# Patient Record
Sex: FEMALE | Race: WHITE | URBAN - METROPOLITAN AREA
[De-identification: names, ages, dates, MRNs, and addresses within clinical notes are randomized per-mention and may not be internally consistent; named-entity substitution may affect disease eponyms.]

---

## 2017-08-30 ENCOUNTER — EMERGENCY (EMERGENCY)
Facility: HOSPITAL | Age: 52
LOS: 1 days | Discharge: HOME | End: 2017-08-30
Attending: EMERGENCY MEDICINE | Admitting: EMERGENCY MEDICINE
Payer: MEDICAID

## 2017-08-30 VITALS
RESPIRATION RATE: 17 BRPM | OXYGEN SATURATION: 100 % | HEART RATE: 82 BPM | TEMPERATURE: 98 F | DIASTOLIC BLOOD PRESSURE: 76 MMHG | SYSTOLIC BLOOD PRESSURE: 107 MMHG

## 2017-08-30 VITALS — SYSTOLIC BLOOD PRESSURE: 115 MMHG | HEART RATE: 75 BPM | DIASTOLIC BLOOD PRESSURE: 70 MMHG

## 2017-08-30 DIAGNOSIS — M25.512 PAIN IN LEFT SHOULDER: ICD-10-CM

## 2017-08-30 PROCEDURE — 99284 EMERGENCY DEPT VISIT MOD MDM: CPT | Mod: 25

## 2017-08-30 PROCEDURE — 73030 X-RAY EXAM OF SHOULDER: CPT | Mod: 26,LT

## 2017-08-30 RX ORDER — KETOROLAC TROMETHAMINE 30 MG/ML
60 SYRINGE (ML) INJECTION ONCE
Qty: 0 | Refills: 0 | Status: DISCONTINUED | OUTPATIENT
Start: 2017-08-30 | End: 2017-08-30

## 2017-08-30 RX ORDER — IBUPROFEN 200 MG
1 TABLET ORAL
Qty: 30 | Refills: 0 | OUTPATIENT
Start: 2017-08-30

## 2017-08-30 RX ORDER — DIAZEPAM 5 MG
1 TABLET ORAL
Qty: 10 | Refills: 0 | OUTPATIENT
Start: 2017-08-30 | End: 2017-09-04

## 2017-08-30 RX ADMIN — Medication 60 MILLIGRAM(S): at 13:41

## 2017-08-30 NOTE — ED PROVIDER NOTE - DIAGNOSTIC INTERPRETATION
Interpreted by ED Physician:  Shoulder xray (3 view)- no fx, no jt effusion, no soft tissue swelling

## 2017-08-30 NOTE — ED PROVIDER NOTE - PROGRESS NOTE DETAILS
Arm improved, better but not normal ROM, x-rays show no acute findings. Sw to help her find ortho MD that takes [employer]. Rx for Motrin/Diazepam.

## 2017-08-30 NOTE — ED PROVIDER NOTE - OBJECTIVE STATEMENT
51 G here with L shoulder pain- increasing over the past few days. may have been triggered by lifting a heavy rock but she is not sure. pain radiated down upper arm. No other injuries. No other complaints. Didn't take anything for it.

## 2017-08-30 NOTE — ED PROVIDER NOTE - MEDICAL DECISION MAKING DETAILS
Patient presenting with deltoid ttp/pain with mvt. Will check x-ray and give PO Diazepam and Toradol. Poss trigger point injection.

## 2017-08-30 NOTE — ED ADULT TRIAGE NOTE - CHIEF COMPLAINT QUOTE
pt moved heavy stone monday evening. developed pain in shoulder and bicep yesterday. painful to straighten arm out

## 2017-08-31 PROBLEM — Z00.00 ENCOUNTER FOR PREVENTIVE HEALTH EXAMINATION: Status: ACTIVE | Noted: 2017-08-31

## 2017-09-06 ENCOUNTER — APPOINTMENT (OUTPATIENT)
Dept: INTERNAL MEDICINE | Facility: CLINIC | Age: 52
End: 2017-09-06

## 2021-03-12 ENCOUNTER — TRANSCRIPTION ENCOUNTER (OUTPATIENT)
Age: 56
End: 2021-03-12

## 2023-09-06 NOTE — ED PROVIDER NOTE - MUSCULOSKELETAL [+], MLM
Hide Additional Notes?: No
Detail Level: Generalized
Include Location In Plan?: Yes
Detail Level: Zone
JOINT PAIN